# Patient Record
Sex: MALE | Race: ASIAN
[De-identification: names, ages, dates, MRNs, and addresses within clinical notes are randomized per-mention and may not be internally consistent; named-entity substitution may affect disease eponyms.]

---

## 2018-02-02 ENCOUNTER — HOSPITAL ENCOUNTER (EMERGENCY)
Dept: HOSPITAL 67 - ED | Age: 68
Discharge: HOME | End: 2018-02-02
Payer: COMMERCIAL

## 2018-02-02 VITALS — SYSTOLIC BLOOD PRESSURE: 144 MMHG | TEMPERATURE: 97.5 F | DIASTOLIC BLOOD PRESSURE: 74 MMHG

## 2018-02-02 VITALS — HEIGHT: 70 IN | WEIGHT: 128 LBS | BODY MASS INDEX: 18.32 KG/M2

## 2018-02-02 DIAGNOSIS — Z72.0: ICD-10-CM

## 2018-02-02 DIAGNOSIS — J43.2: Primary | ICD-10-CM

## 2018-02-02 DIAGNOSIS — F10.10: ICD-10-CM

## 2018-02-02 LAB
PLATELET # BLD: 287 K/UL (ref 142–355)
POTASSIUM SERPL-SCNC: 3.8 MMOL/L (ref 3.6–5.2)
SODIUM SERPL-SCNC: 138 MMOL/L (ref 136–145)

## 2018-02-02 PROCEDURE — 82550 ASSAY OF CK (CPK): CPT

## 2018-02-02 PROCEDURE — 84484 ASSAY OF TROPONIN QUANT: CPT

## 2018-02-02 PROCEDURE — 93005 ELECTROCARDIOGRAM TRACING: CPT

## 2018-02-02 PROCEDURE — 80053 COMPREHEN METABOLIC PANEL: CPT

## 2018-02-02 PROCEDURE — 85027 COMPLETE CBC AUTOMATED: CPT

## 2018-02-02 PROCEDURE — 99283 EMERGENCY DEPT VISIT LOW MDM: CPT

## 2019-09-17 ENCOUNTER — HOSPITAL ENCOUNTER (EMERGENCY)
Dept: HOSPITAL 67 - ED | Age: 69
Discharge: ANOTHER HOSPITAL | End: 2019-09-17
Payer: COMMERCIAL

## 2019-09-17 VITALS — DIASTOLIC BLOOD PRESSURE: 93 MMHG | SYSTOLIC BLOOD PRESSURE: 139 MMHG

## 2019-09-17 VITALS — WEIGHT: 100 LBS | BODY MASS INDEX: 14.32 KG/M2 | HEIGHT: 70 IN

## 2019-09-17 VITALS — TEMPERATURE: 97.2 F

## 2019-09-17 DIAGNOSIS — G46.0: ICD-10-CM

## 2019-09-17 DIAGNOSIS — F17.210: ICD-10-CM

## 2019-09-17 DIAGNOSIS — I63.411: Primary | ICD-10-CM

## 2019-09-17 DIAGNOSIS — G81.94: ICD-10-CM

## 2019-09-17 LAB
APTT BLD: 24.3 SECONDS (ref 24.5–33.6)
PLATELET # BLD: 242 K/UL (ref 142–355)
POTASSIUM SERPL-SCNC: 4.1 MMOL/L (ref 3.6–5.2)
SODIUM SERPL-SCNC: 144 MMOL/L (ref 136–145)

## 2019-09-17 PROCEDURE — 80307 DRUG TEST PRSMV CHEM ANLYZR: CPT

## 2019-09-17 PROCEDURE — 85730 THROMBOPLASTIN TIME PARTIAL: CPT

## 2019-09-17 PROCEDURE — 0T9B70Z DRAINAGE OF BLADDER WITH DRAINAGE DEVICE, VIA NATURAL OR ARTIFICIAL OPENING: ICD-10-PCS | Performed by: FAMILY MEDICINE

## 2019-09-17 PROCEDURE — 84484 ASSAY OF TROPONIN QUANT: CPT

## 2019-09-17 PROCEDURE — 99285 EMERGENCY DEPT VISIT HI MDM: CPT

## 2019-09-17 PROCEDURE — 96365 THER/PROPH/DIAG IV INF INIT: CPT

## 2019-09-17 PROCEDURE — 96375 TX/PRO/DX INJ NEW DRUG ADDON: CPT

## 2019-09-17 PROCEDURE — 96361 HYDRATE IV INFUSION ADD-ON: CPT

## 2019-09-17 PROCEDURE — 80320 DRUG SCREEN QUANTALCOHOLS: CPT

## 2019-09-17 PROCEDURE — 82550 ASSAY OF CK (CPK): CPT

## 2019-09-17 PROCEDURE — 85027 COMPLETE CBC AUTOMATED: CPT

## 2019-09-17 PROCEDURE — 85610 PROTHROMBIN TIME: CPT

## 2019-09-17 PROCEDURE — 80053 COMPREHEN METABOLIC PANEL: CPT

## 2019-09-17 PROCEDURE — 93005 ELECTROCARDIOGRAM TRACING: CPT

## 2019-09-17 PROCEDURE — 81000 URINALYSIS NONAUTO W/SCOPE: CPT

## 2019-09-17 PROCEDURE — 51702 INSERT TEMP BLADDER CATH: CPT

## 2019-11-21 ENCOUNTER — HOSPITAL ENCOUNTER (OUTPATIENT)
Dept: HOSPITAL 67 - RAD | Age: 69
Discharge: HOME | End: 2019-11-21
Attending: NURSE PRACTITIONER
Payer: COMMERCIAL

## 2019-11-21 DIAGNOSIS — M54.12: Primary | ICD-10-CM

## 2019-12-17 ENCOUNTER — HOSPITAL ENCOUNTER (OUTPATIENT)
Dept: HOSPITAL 67 - MRI | Age: 69
Discharge: HOME | End: 2019-12-17
Attending: NURSE PRACTITIONER
Payer: COMMERCIAL

## 2019-12-17 DIAGNOSIS — M47.812: Primary | ICD-10-CM

## 2020-03-26 ENCOUNTER — HOSPITAL ENCOUNTER (EMERGENCY)
Dept: HOSPITAL 67 - ED | Age: 70
Discharge: HOME | End: 2020-03-26
Payer: COMMERCIAL

## 2020-03-26 VITALS — HEIGHT: 70 IN | BODY MASS INDEX: 17.32 KG/M2 | WEIGHT: 121 LBS

## 2020-03-26 VITALS — SYSTOLIC BLOOD PRESSURE: 111 MMHG | DIASTOLIC BLOOD PRESSURE: 69 MMHG | TEMPERATURE: 98.1 F

## 2020-03-26 DIAGNOSIS — L02.416: Primary | ICD-10-CM

## 2020-03-26 PROCEDURE — 99282 EMERGENCY DEPT VISIT SF MDM: CPT

## 2021-07-01 ENCOUNTER — HOSPITAL ENCOUNTER (OUTPATIENT)
Dept: HOSPITAL 67 - RAD | Age: 71
Discharge: HOME | End: 2021-07-01
Attending: NURSE PRACTITIONER
Payer: COMMERCIAL

## 2021-07-01 DIAGNOSIS — J44.9: Primary | ICD-10-CM

## 2021-07-30 ENCOUNTER — HOSPITAL ENCOUNTER (OUTPATIENT)
Dept: HOSPITAL 67 - CT | Age: 71
Discharge: HOME | End: 2021-07-30
Attending: NURSE PRACTITIONER
Payer: COMMERCIAL

## 2021-07-30 DIAGNOSIS — F17.210: Primary | ICD-10-CM

## 2021-08-05 ENCOUNTER — HOSPITAL ENCOUNTER (OUTPATIENT)
Dept: HOSPITAL 67 - MED/SURG | Age: 71
Setting detail: OBSERVATION
LOS: 1 days | Discharge: HOME | End: 2021-08-06
Attending: FAMILY MEDICINE | Admitting: FAMILY MEDICINE
Payer: COMMERCIAL

## 2021-08-05 VITALS — TEMPERATURE: 97.5 F | DIASTOLIC BLOOD PRESSURE: 81 MMHG | SYSTOLIC BLOOD PRESSURE: 126 MMHG

## 2021-08-05 VITALS — SYSTOLIC BLOOD PRESSURE: 88 MMHG | TEMPERATURE: 98.3 F | DIASTOLIC BLOOD PRESSURE: 54 MMHG

## 2021-08-05 VITALS
BODY MASS INDEX: 15.4 KG/M2 | WEIGHT: 107.56 LBS | HEIGHT: 70 IN | HEIGHT: 70 IN | BODY MASS INDEX: 15.4 KG/M2 | WEIGHT: 107.56 LBS

## 2021-08-05 DIAGNOSIS — F19.20: ICD-10-CM

## 2021-08-05 DIAGNOSIS — R91.8: ICD-10-CM

## 2021-08-05 DIAGNOSIS — R62.7: Primary | ICD-10-CM

## 2021-08-05 DIAGNOSIS — E86.0: ICD-10-CM

## 2021-08-05 LAB
PLATELET # BLD: 236 K/UL (ref 142–355)
POTASSIUM SERPL-SCNC: 4 MMOL/L (ref 3.6–5.2)
SODIUM SERPL-SCNC: 141 MMOL/L (ref 136–145)

## 2021-08-05 PROCEDURE — 81000 URINALYSIS NONAUTO W/SCOPE: CPT

## 2021-08-05 PROCEDURE — 82550 ASSAY OF CK (CPK): CPT

## 2021-08-05 PROCEDURE — U0003 INFECTIOUS AGENT DETECTION BY NUCLEIC ACID (DNA OR RNA); SEVERE ACUTE RESPIRATORY SYNDROME CORONAVIRUS 2 (SARS-COV-2) (CORONAVIRUS DISEASE [COVID-19]), AMPLIFIED PROBE TECHNIQUE, MAKING USE OF HIGH THROUGHPUT TECHNOLOGIES AS DESCRIBED BY CMS-2020-01-R: HCPCS

## 2021-08-05 PROCEDURE — 96360 HYDRATION IV INFUSION INIT: CPT

## 2021-08-05 PROCEDURE — 94640 AIRWAY INHALATION TREATMENT: CPT

## 2021-08-05 PROCEDURE — 94664 DEMO&/EVAL PT USE INHALER: CPT

## 2021-08-05 PROCEDURE — 94760 N-INVAS EAR/PLS OXIMETRY 1: CPT

## 2021-08-05 PROCEDURE — 93005 ELECTROCARDIOGRAM TRACING: CPT

## 2021-08-05 PROCEDURE — G0378 HOSPITAL OBSERVATION PER HR: HCPCS

## 2021-08-05 PROCEDURE — 80307 DRUG TEST PRSMV CHEM ANLYZR: CPT

## 2021-08-05 PROCEDURE — 80053 COMPREHEN METABOLIC PANEL: CPT

## 2021-08-05 PROCEDURE — G0379 DIRECT REFER HOSPITAL OBSERV: HCPCS

## 2021-08-05 PROCEDURE — 87635 SARS-COV-2 COVID-19 AMP PRB: CPT

## 2021-08-05 PROCEDURE — 84484 ASSAY OF TROPONIN QUANT: CPT

## 2021-08-05 PROCEDURE — 99220: CPT

## 2021-08-05 PROCEDURE — 87040 BLOOD CULTURE FOR BACTERIA: CPT

## 2021-08-05 PROCEDURE — 83880 ASSAY OF NATRIURETIC PEPTIDE: CPT

## 2021-08-05 PROCEDURE — 96361 HYDRATE IV INFUSION ADD-ON: CPT

## 2021-08-05 PROCEDURE — 85027 COMPLETE CBC AUTOMATED: CPT

## 2021-08-05 NOTE — NUR
PT AWAKE, ALERT,ORIENTED X4. PT DENIES PAIN AT THIS TIME. IV FLUID NS @80
ML/HR INFUSING WITHOUT DIFFICULTY. NO DISTRESS NOTED.

## 2021-08-06 VITALS — DIASTOLIC BLOOD PRESSURE: 51 MMHG | TEMPERATURE: 98.1 F | SYSTOLIC BLOOD PRESSURE: 84 MMHG

## 2021-08-06 VITALS — DIASTOLIC BLOOD PRESSURE: 45 MMHG | SYSTOLIC BLOOD PRESSURE: 78 MMHG | TEMPERATURE: 97.7 F

## 2021-08-06 VITALS — TEMPERATURE: 97.7 F | DIASTOLIC BLOOD PRESSURE: 56 MMHG | SYSTOLIC BLOOD PRESSURE: 91 MMHG

## 2021-08-06 VITALS — TEMPERATURE: 97.6 F | DIASTOLIC BLOOD PRESSURE: 57 MMHG | SYSTOLIC BLOOD PRESSURE: 97 MMHG

## 2021-08-06 NOTE — NUR
8/6/2021 0950 PT AWAKE ALERT AMBULATED TO RESTROOM WITHOUT DIFFICULTY NAD
NOTED.CALL LIGHT WITHIN REACH.CC

## 2021-08-06 NOTE — NUR
PT SLEEPING. IV FLUID INFUSING WITHOUT PROBLEM. PT RECEIVING RESPIRATORY
TREATMENTS. NO VOICED COMPLAINTS

## 2021-08-06 NOTE — NUR
8/6/21 1627 DISCHARGED TO HOME WITH BROTHER INSTRUCTIONS GIVEN AND SIGNED.SL
REMOVED TO RT FOREARM APPLIED 2X2 SECURED WITH TAPE.CC

## 2021-10-25 ENCOUNTER — HOSPITAL ENCOUNTER (OUTPATIENT)
Dept: HOSPITAL 67 - CT | Age: 71
Discharge: HOME | End: 2021-10-25
Attending: INTERNAL MEDICINE
Payer: COMMERCIAL

## 2021-10-25 DIAGNOSIS — R91.8: Primary | ICD-10-CM

## 2022-03-09 ENCOUNTER — HOSPITAL ENCOUNTER (OUTPATIENT)
Dept: HOSPITAL 67 - US | Age: 72
Discharge: HOME | End: 2022-03-09
Attending: NURSE PRACTITIONER
Payer: COMMERCIAL

## 2022-03-09 DIAGNOSIS — I73.9: Primary | ICD-10-CM

## 2022-03-16 ENCOUNTER — HOSPITAL ENCOUNTER (OUTPATIENT)
Dept: HOSPITAL 67 - RESP | Age: 72
Discharge: HOME | End: 2022-03-16
Attending: NURSE PRACTITIONER
Payer: COMMERCIAL

## 2022-03-16 DIAGNOSIS — I10: Primary | ICD-10-CM

## 2022-03-24 ENCOUNTER — HOSPITAL ENCOUNTER (OUTPATIENT)
Dept: HOSPITAL 67 - NM | Age: 72
Discharge: HOME | End: 2022-03-24
Attending: NURSE PRACTITIONER
Payer: COMMERCIAL

## 2022-03-24 VITALS — BODY MASS INDEX: 16.8 KG/M2 | HEIGHT: 71 IN | WEIGHT: 120 LBS

## 2022-03-24 DIAGNOSIS — Z79.899: ICD-10-CM

## 2022-03-24 DIAGNOSIS — I10: Primary | ICD-10-CM

## 2022-03-24 PROCEDURE — A9500 TC99M SESTAMIBI: HCPCS

## 2022-10-10 ENCOUNTER — HOSPITAL ENCOUNTER (OUTPATIENT)
Dept: HOSPITAL 67 - MED/SURG | Age: 72
Setting detail: OBSERVATION
LOS: 1 days | Discharge: HOME | End: 2022-10-11
Attending: FAMILY MEDICINE | Admitting: FAMILY MEDICINE
Payer: COMMERCIAL

## 2022-10-10 VITALS
BODY MASS INDEX: 15.91 KG/M2 | HEIGHT: 70 IN | WEIGHT: 111.13 LBS | WEIGHT: 111.13 LBS | BODY MASS INDEX: 15.91 KG/M2 | HEIGHT: 70 IN

## 2022-10-10 VITALS — DIASTOLIC BLOOD PRESSURE: 90 MMHG | TEMPERATURE: 97.6 F | SYSTOLIC BLOOD PRESSURE: 121 MMHG

## 2022-10-10 VITALS — TEMPERATURE: 98.4 F | SYSTOLIC BLOOD PRESSURE: 102 MMHG | DIASTOLIC BLOOD PRESSURE: 67 MMHG

## 2022-10-10 DIAGNOSIS — Z11.52: ICD-10-CM

## 2022-10-10 DIAGNOSIS — F19.10: ICD-10-CM

## 2022-10-10 DIAGNOSIS — I10: ICD-10-CM

## 2022-10-10 DIAGNOSIS — R07.89: Primary | ICD-10-CM

## 2022-10-10 DIAGNOSIS — I50.9: ICD-10-CM

## 2022-10-10 LAB
APTT BLD: 28 SECONDS (ref 24.5–33.6)
PLATELET # BLD: 204 K/UL (ref 142–355)
POTASSIUM SERPL-SCNC: 4.8 MMOL/L (ref 3.6–5.2)
SODIUM SERPL-SCNC: 139 MMOL/L (ref 136–145)

## 2022-10-10 PROCEDURE — 36415 COLL VENOUS BLD VENIPUNCTURE: CPT

## 2022-10-10 PROCEDURE — 84484 ASSAY OF TROPONIN QUANT: CPT

## 2022-10-10 PROCEDURE — 85027 COMPLETE CBC AUTOMATED: CPT

## 2022-10-10 PROCEDURE — 83735 ASSAY OF MAGNESIUM: CPT

## 2022-10-10 PROCEDURE — 87502 INFLUENZA DNA AMP PROBE: CPT

## 2022-10-10 PROCEDURE — 82550 ASSAY OF CK (CPK): CPT

## 2022-10-10 PROCEDURE — 80053 COMPREHEN METABOLIC PANEL: CPT

## 2022-10-10 PROCEDURE — G0379 DIRECT REFER HOSPITAL OBSERV: HCPCS

## 2022-10-10 PROCEDURE — 96372 THER/PROPH/DIAG INJ SC/IM: CPT

## 2022-10-10 PROCEDURE — G0378 HOSPITAL OBSERVATION PER HR: HCPCS

## 2022-10-10 PROCEDURE — 83880 ASSAY OF NATRIURETIC PEPTIDE: CPT

## 2022-10-10 PROCEDURE — 87635 SARS-COV-2 COVID-19 AMP PRB: CPT

## 2022-10-10 PROCEDURE — 85610 PROTHROMBIN TIME: CPT

## 2022-10-10 PROCEDURE — U0003 INFECTIOUS AGENT DETECTION BY NUCLEIC ACID (DNA OR RNA); SEVERE ACUTE RESPIRATORY SYNDROME CORONAVIRUS 2 (SARS-COV-2) (CORONAVIRUS DISEASE [COVID-19]), AMPLIFIED PROBE TECHNIQUE, MAKING USE OF HIGH THROUGHPUT TECHNOLOGIES AS DESCRIBED BY CMS-2020-01-R: HCPCS

## 2022-10-10 PROCEDURE — 84100 ASSAY OF PHOSPHORUS: CPT

## 2022-10-10 PROCEDURE — 93005 ELECTROCARDIOGRAM TRACING: CPT

## 2022-10-10 PROCEDURE — 96374 THER/PROPH/DIAG INJ IV PUSH: CPT

## 2022-10-10 PROCEDURE — 85730 THROMBOPLASTIN TIME PARTIAL: CPT

## 2022-10-10 PROCEDURE — 99220: CPT

## 2022-10-10 NOTE — NUR
PATIENT ADMITTED TO  SERVICES DIRECT ADMIT ORIENTED TO ROOM AND CALL
LIGHT SYSTEM.VISTIOR PRESENT IN ROOM.CC

## 2022-10-10 NOTE — NUR
CHARGE NURSE SPOKE WITH  AT 18:10 ABOUT PATIENT'S RECENT LABS. NEW
ORDERS RECEIVED WERE TO ORDER A 1X DOSE OF 20MG IVP LASIX FOR ELEVATED BNP OF
1080. MISTAKEN ENTRY FOR PO 20MG LASIX, D/C ORDER, AND CREATED A NEW ORDER.
CHARGE NURSE AWARE.

## 2022-10-10 NOTE — NUR
REC'D PT LYING IN BED WITH EYES CLOSED. PT'S VS WNL. DENIES CHEST PAIN OR SOB.
TELEMETRY SHOW NORMAL SINUS RYTHMN. NO S/SX OF DISTRESS NOTED. CALL LITH
WITHIN REACH.

## 2022-10-11 VITALS — DIASTOLIC BLOOD PRESSURE: 63 MMHG | SYSTOLIC BLOOD PRESSURE: 93 MMHG | TEMPERATURE: 97.9 F

## 2022-10-11 VITALS — DIASTOLIC BLOOD PRESSURE: 63 MMHG | SYSTOLIC BLOOD PRESSURE: 107 MMHG | TEMPERATURE: 97.6 F

## 2022-10-11 VITALS — DIASTOLIC BLOOD PRESSURE: 61 MMHG | SYSTOLIC BLOOD PRESSURE: 92 MMHG | TEMPERATURE: 98.3 F

## 2022-10-11 VITALS — SYSTOLIC BLOOD PRESSURE: 93 MMHG | DIASTOLIC BLOOD PRESSURE: 54 MMHG | TEMPERATURE: 98 F

## 2022-10-11 VITALS — DIASTOLIC BLOOD PRESSURE: 66 MMHG | SYSTOLIC BLOOD PRESSURE: 105 MMHG | TEMPERATURE: 98.3 F

## 2022-10-11 LAB
PLATELET # BLD: 212 K/UL (ref 142–355)
POTASSIUM SERPL-SCNC: 4 MMOL/L (ref 3.6–5.2)
SODIUM SERPL-SCNC: 138 MMOL/L (ref 136–145)

## 2022-10-11 NOTE — NUR
SALINE LOCK REMOVED APPLIED 2X2 SECURED WITH TAPE,ALSO TELE REMOVED CLEANED
AND RETURNED TO NURSING STATION. DAUGHTER KELLIE NOTIFIED OF PATEINT BEING
DISCHARGED HOME WILL FOLLOW UP WITH  ON OCT 18,2022 AT 11.30 AM IN
[place] OFFICE.ALSO LASIX
20MG PO CALLED IN TO [facility] IN [facility].PATIENT
ASSISTED TO GET DRESSED WAITNG ON DAUGHTER TO PICK HIM UP.CC

## 2022-10-11 NOTE — NUR
DAUGHTER CALLED TO NURSING STATION UPDATED ON PATIENT CARE,EXPLAINED TO HER WE
ARE WAITING ON SOME LABS THIS MORNING WILL CALL HER [facility]  COMES IN
WITH UPDATE.TOLD HER HER DAD IS DOING GOOD THIS MORNING NO C/O CHESTPAIN THIS
AM OR NO PAIN DURING THE NIGHT.CC

## 2022-10-11 NOTE — NUR
PATIENT LYING IN BED TALKING WITH DAUGHTER AT BEDSIDE NO C/O PAIN AT THIS
TIME.DISCUSSED PLAN OF CARE WITH PATIENT AND DAUGHTER EXPLAINED TO HER
 WILL POSSIBLY DISCHARGE HIM THIS AFTERNOON AND SEND HIM HOME ON
LASIX 20MG PO DAILY.ALSO FOLLOW UP WITH  ON OCT 18,1130 AM.TOLD HER I
WILL CALL HER WITH ANY FURTHER INFORMATION.CC

## 2022-10-11 NOTE — NUR
DISCHARGE INSTRUCTIONS GIVEN AND SIGNED ALONG WITH APPT CARD FOR  APPT
TIME.ALSO RX FOR LASIX 20MG PO CALLED INTO [facility] IN [place] TOTAL 30
TABLETS.CC

## 2022-10-11 NOTE — NUR
PT LYING IN BED EYES CLOSED. PT DENIES CHEST PAIN. TROPONIN LEVEL INCREASED
FRON 80.30 TO 87.O. TEXTED INFO TO DR. CARRILLO. CURRENT TELEMETRY SHOWS PAC'S
AND PVC'S. NO S/SX OF DISTRESS NTED PT. STATED HE FELT FINE.

## 2022-10-11 NOTE — NUR
AWAKE ALERT LYING IN BED NO C/O CHESTPAIN OR DISCOMFORT RESP EVEN
NONLABORED.CALL LIGHT WITHIN REACH.CC

## 2022-10-15 ENCOUNTER — HOSPITAL ENCOUNTER (EMERGENCY)
Dept: HOSPITAL 67 - ED | Age: 72
Discharge: HOME | End: 2022-10-15
Payer: COMMERCIAL

## 2022-10-15 VITALS — WEIGHT: 110 LBS | BODY MASS INDEX: 15.75 KG/M2 | HEIGHT: 70 IN

## 2022-10-15 VITALS — TEMPERATURE: 98.2 F | DIASTOLIC BLOOD PRESSURE: 74 MMHG | SYSTOLIC BLOOD PRESSURE: 105 MMHG

## 2022-10-15 DIAGNOSIS — I50.9: Primary | ICD-10-CM

## 2022-10-15 DIAGNOSIS — N18.9: ICD-10-CM

## 2022-10-15 DIAGNOSIS — F17.210: ICD-10-CM

## 2022-10-15 DIAGNOSIS — Z79.899: ICD-10-CM

## 2022-10-15 DIAGNOSIS — J44.9: ICD-10-CM

## 2022-10-15 LAB
APTT BLD: 27 SECONDS (ref 24.5–33.6)
PLATELET # BLD: 221 K/UL (ref 142–355)
POTASSIUM SERPL-SCNC: 4.1 MMOL/L (ref 3.6–5.2)
SODIUM SERPL-SCNC: 135 MMOL/L (ref 136–145)

## 2022-10-15 PROCEDURE — 80307 DRUG TEST PRSMV CHEM ANLYZR: CPT

## 2022-10-15 PROCEDURE — 36415 COLL VENOUS BLD VENIPUNCTURE: CPT

## 2022-10-15 PROCEDURE — 93005 ELECTROCARDIOGRAM TRACING: CPT

## 2022-10-15 PROCEDURE — 85610 PROTHROMBIN TIME: CPT

## 2022-10-15 PROCEDURE — 85730 THROMBOPLASTIN TIME PARTIAL: CPT

## 2022-10-15 PROCEDURE — 99284 EMERGENCY DEPT VISIT MOD MDM: CPT

## 2022-10-15 PROCEDURE — 84484 ASSAY OF TROPONIN QUANT: CPT

## 2022-10-15 PROCEDURE — 83880 ASSAY OF NATRIURETIC PEPTIDE: CPT

## 2022-10-15 PROCEDURE — 80053 COMPREHEN METABOLIC PANEL: CPT

## 2022-10-15 PROCEDURE — 82550 ASSAY OF CK (CPK): CPT

## 2022-10-15 PROCEDURE — 85027 COMPLETE CBC AUTOMATED: CPT

## 2022-10-17 ENCOUNTER — HOSPITAL ENCOUNTER (OUTPATIENT)
Dept: HOSPITAL 67 - LAB | Age: 72
Discharge: HOME | End: 2022-10-17
Attending: NURSE PRACTITIONER
Payer: COMMERCIAL

## 2022-10-17 DIAGNOSIS — I50.20: Primary | ICD-10-CM

## 2022-10-17 LAB
POTASSIUM SERPL-SCNC: 4.9 MMOL/L (ref 3.6–5.2)
SODIUM SERPL-SCNC: 136 MMOL/L (ref 136–145)

## 2022-10-17 PROCEDURE — 83880 ASSAY OF NATRIURETIC PEPTIDE: CPT

## 2022-10-17 PROCEDURE — 85027 COMPLETE CBC AUTOMATED: CPT

## 2022-10-17 PROCEDURE — 80053 COMPREHEN METABOLIC PANEL: CPT

## 2022-11-30 ENCOUNTER — HOSPITAL ENCOUNTER (EMERGENCY)
Dept: HOSPITAL 67 - ED | Age: 72
Discharge: ANOTHER HOSPITAL | End: 2022-11-30
Payer: COMMERCIAL

## 2022-11-30 VITALS — WEIGHT: 112 LBS | HEIGHT: 70 IN | BODY MASS INDEX: 16.03 KG/M2

## 2022-11-30 VITALS — DIASTOLIC BLOOD PRESSURE: 71 MMHG | SYSTOLIC BLOOD PRESSURE: 112 MMHG

## 2022-11-30 VITALS — TEMPERATURE: 98 F

## 2022-11-30 DIAGNOSIS — Z79.01: ICD-10-CM

## 2022-11-30 DIAGNOSIS — U07.1: ICD-10-CM

## 2022-11-30 DIAGNOSIS — I21.4: Primary | ICD-10-CM

## 2022-11-30 DIAGNOSIS — F17.210: ICD-10-CM

## 2022-11-30 DIAGNOSIS — I10: ICD-10-CM

## 2022-11-30 DIAGNOSIS — Z51.81: ICD-10-CM

## 2022-11-30 DIAGNOSIS — J44.1: ICD-10-CM

## 2022-11-30 LAB
PLATELET # BLD: 236 K/UL (ref 142–355)
POTASSIUM SERPL-SCNC: 3.9 MMOL/L (ref 3.6–5.2)
SODIUM SERPL-SCNC: 140 MMOL/L (ref 136–145)

## 2022-11-30 PROCEDURE — U0003 INFECTIOUS AGENT DETECTION BY NUCLEIC ACID (DNA OR RNA); SEVERE ACUTE RESPIRATORY SYNDROME CORONAVIRUS 2 (SARS-COV-2) (CORONAVIRUS DISEASE [COVID-19]), AMPLIFIED PROBE TECHNIQUE, MAKING USE OF HIGH THROUGHPUT TECHNOLOGIES AS DESCRIBED BY CMS-2020-01-R: HCPCS

## 2022-11-30 PROCEDURE — 83880 ASSAY OF NATRIURETIC PEPTIDE: CPT

## 2022-11-30 PROCEDURE — 36600 WITHDRAWAL OF ARTERIAL BLOOD: CPT

## 2022-11-30 PROCEDURE — 85027 COMPLETE CBC AUTOMATED: CPT

## 2022-11-30 PROCEDURE — 96372 THER/PROPH/DIAG INJ SC/IM: CPT

## 2022-11-30 PROCEDURE — 94664 DEMO&/EVAL PT USE INHALER: CPT

## 2022-11-30 PROCEDURE — 87040 BLOOD CULTURE FOR BACTERIA: CPT

## 2022-11-30 PROCEDURE — 93005 ELECTROCARDIOGRAM TRACING: CPT

## 2022-11-30 PROCEDURE — 84484 ASSAY OF TROPONIN QUANT: CPT

## 2022-11-30 PROCEDURE — 99284 EMERGENCY DEPT VISIT MOD MDM: CPT

## 2022-11-30 PROCEDURE — 81002 URINALYSIS NONAUTO W/O SCOPE: CPT

## 2022-11-30 PROCEDURE — 96360 HYDRATION IV INFUSION INIT: CPT

## 2022-11-30 PROCEDURE — 96365 THER/PROPH/DIAG IV INF INIT: CPT

## 2022-11-30 PROCEDURE — 96375 TX/PRO/DX INJ NEW DRUG ADDON: CPT

## 2022-11-30 PROCEDURE — 85379 FIBRIN DEGRADATION QUANT: CPT

## 2022-11-30 PROCEDURE — 87635 SARS-COV-2 COVID-19 AMP PRB: CPT

## 2022-11-30 PROCEDURE — 80053 COMPREHEN METABOLIC PANEL: CPT

## 2022-11-30 PROCEDURE — 82805 BLOOD GASES W/O2 SATURATION: CPT

## 2022-11-30 PROCEDURE — 87502 INFLUENZA DNA AMP PROBE: CPT

## 2022-11-30 PROCEDURE — 83735 ASSAY OF MAGNESIUM: CPT

## 2022-11-30 PROCEDURE — 80307 DRUG TEST PRSMV CHEM ANLYZR: CPT

## 2022-12-12 ENCOUNTER — HOSPITAL ENCOUNTER (OUTPATIENT)
Dept: HOSPITAL 67 - RAD | Age: 72
Discharge: HOME | End: 2022-12-12
Attending: NURSE PRACTITIONER
Payer: COMMERCIAL

## 2022-12-12 DIAGNOSIS — J12.89: Primary | ICD-10-CM

## 2022-12-12 LAB — PLATELET # BLD: 227 K/UL (ref 142–355)

## 2022-12-12 PROCEDURE — 82805 BLOOD GASES W/O2 SATURATION: CPT

## 2022-12-12 PROCEDURE — 36600 WITHDRAWAL OF ARTERIAL BLOOD: CPT

## 2022-12-12 PROCEDURE — 36415 COLL VENOUS BLD VENIPUNCTURE: CPT

## 2022-12-12 PROCEDURE — 85027 COMPLETE CBC AUTOMATED: CPT

## 2023-03-03 ENCOUNTER — HOSPITAL ENCOUNTER (OUTPATIENT)
Dept: HOSPITAL 67 - ED | Age: 73
Setting detail: OBSERVATION
LOS: 2 days | Discharge: HOME | End: 2023-03-05
Attending: INTERNAL MEDICINE | Admitting: INTERNAL MEDICINE
Payer: COMMERCIAL

## 2023-03-03 VITALS — DIASTOLIC BLOOD PRESSURE: 65 MMHG | SYSTOLIC BLOOD PRESSURE: 102 MMHG

## 2023-03-03 VITALS — DIASTOLIC BLOOD PRESSURE: 58 MMHG | TEMPERATURE: 98.2 F | SYSTOLIC BLOOD PRESSURE: 94 MMHG

## 2023-03-03 VITALS
HEIGHT: 70 IN | BODY MASS INDEX: 16.36 KG/M2 | BODY MASS INDEX: 16.36 KG/M2 | WEIGHT: 114.25 LBS | HEIGHT: 70 IN | WEIGHT: 114.25 LBS

## 2023-03-03 VITALS — SYSTOLIC BLOOD PRESSURE: 123 MMHG | DIASTOLIC BLOOD PRESSURE: 78 MMHG

## 2023-03-03 VITALS — TEMPERATURE: 97.3 F | SYSTOLIC BLOOD PRESSURE: 125 MMHG | DIASTOLIC BLOOD PRESSURE: 87 MMHG

## 2023-03-03 VITALS — SYSTOLIC BLOOD PRESSURE: 11 MMHG | TEMPERATURE: 98.2 F | DIASTOLIC BLOOD PRESSURE: 78 MMHG

## 2023-03-03 VITALS — SYSTOLIC BLOOD PRESSURE: 123 MMHG | DIASTOLIC BLOOD PRESSURE: 79 MMHG

## 2023-03-03 VITALS — SYSTOLIC BLOOD PRESSURE: 113 MMHG | DIASTOLIC BLOOD PRESSURE: 82 MMHG

## 2023-03-03 DIAGNOSIS — J44.9: ICD-10-CM

## 2023-03-03 DIAGNOSIS — F12.10: ICD-10-CM

## 2023-03-03 DIAGNOSIS — Z51.81: ICD-10-CM

## 2023-03-03 DIAGNOSIS — Z79.01: ICD-10-CM

## 2023-03-03 DIAGNOSIS — J81.1: ICD-10-CM

## 2023-03-03 DIAGNOSIS — F14.10: ICD-10-CM

## 2023-03-03 DIAGNOSIS — R07.89: ICD-10-CM

## 2023-03-03 DIAGNOSIS — I50.23: ICD-10-CM

## 2023-03-03 DIAGNOSIS — F10.10: ICD-10-CM

## 2023-03-03 DIAGNOSIS — I11.0: Primary | ICD-10-CM

## 2023-03-03 DIAGNOSIS — R06.02: ICD-10-CM

## 2023-03-03 DIAGNOSIS — R09.02: ICD-10-CM

## 2023-03-03 DIAGNOSIS — I95.89: ICD-10-CM

## 2023-03-03 LAB
PLATELET # BLD: 236 K/UL (ref 142–355)
POTASSIUM SERPL-SCNC: 4.1 MMOL/L (ref 3.6–5.2)
SODIUM SERPL-SCNC: 140 MMOL/L (ref 136–145)

## 2023-03-03 PROCEDURE — G0378 HOSPITAL OBSERVATION PER HR: HCPCS

## 2023-03-03 PROCEDURE — 80320 DRUG SCREEN QUANTALCOHOLS: CPT

## 2023-03-03 PROCEDURE — 87635 SARS-COV-2 COVID-19 AMP PRB: CPT

## 2023-03-03 PROCEDURE — 83735 ASSAY OF MAGNESIUM: CPT

## 2023-03-03 PROCEDURE — 81002 URINALYSIS NONAUTO W/O SCOPE: CPT

## 2023-03-03 PROCEDURE — 80307 DRUG TEST PRSMV CHEM ANLYZR: CPT

## 2023-03-03 PROCEDURE — 83880 ASSAY OF NATRIURETIC PEPTIDE: CPT

## 2023-03-03 PROCEDURE — 99220: CPT

## 2023-03-03 PROCEDURE — 84484 ASSAY OF TROPONIN QUANT: CPT

## 2023-03-03 PROCEDURE — 96374 THER/PROPH/DIAG INJ IV PUSH: CPT

## 2023-03-03 PROCEDURE — 82550 ASSAY OF CK (CPK): CPT

## 2023-03-03 PROCEDURE — 84100 ASSAY OF PHOSPHORUS: CPT

## 2023-03-03 PROCEDURE — U0003 INFECTIOUS AGENT DETECTION BY NUCLEIC ACID (DNA OR RNA); SEVERE ACUTE RESPIRATORY SYNDROME CORONAVIRUS 2 (SARS-COV-2) (CORONAVIRUS DISEASE [COVID-19]), AMPLIFIED PROBE TECHNIQUE, MAKING USE OF HIGH THROUGHPUT TECHNOLOGIES AS DESCRIBED BY CMS-2020-01-R: HCPCS

## 2023-03-03 PROCEDURE — 36415 COLL VENOUS BLD VENIPUNCTURE: CPT

## 2023-03-03 PROCEDURE — 93005 ELECTROCARDIOGRAM TRACING: CPT

## 2023-03-03 PROCEDURE — 99284 EMERGENCY DEPT VISIT MOD MDM: CPT

## 2023-03-03 PROCEDURE — 80053 COMPREHEN METABOLIC PANEL: CPT

## 2023-03-03 PROCEDURE — 85027 COMPLETE CBC AUTOMATED: CPT

## 2023-03-04 VITALS — SYSTOLIC BLOOD PRESSURE: 83 MMHG | TEMPERATURE: 97.6 F | DIASTOLIC BLOOD PRESSURE: 50 MMHG

## 2023-03-04 VITALS — TEMPERATURE: 98.4 F | SYSTOLIC BLOOD PRESSURE: 87 MMHG | DIASTOLIC BLOOD PRESSURE: 52 MMHG

## 2023-03-04 VITALS — DIASTOLIC BLOOD PRESSURE: 60 MMHG | TEMPERATURE: 98.3 F | SYSTOLIC BLOOD PRESSURE: 92 MMHG

## 2023-03-04 VITALS — TEMPERATURE: 97.6 F | DIASTOLIC BLOOD PRESSURE: 69 MMHG | SYSTOLIC BLOOD PRESSURE: 96 MMHG

## 2023-03-04 VITALS — DIASTOLIC BLOOD PRESSURE: 51 MMHG | SYSTOLIC BLOOD PRESSURE: 84 MMHG | TEMPERATURE: 97.4 F

## 2023-03-04 VITALS — SYSTOLIC BLOOD PRESSURE: 99 MMHG | DIASTOLIC BLOOD PRESSURE: 67 MMHG | TEMPERATURE: 98.7 F

## 2023-03-05 VITALS — SYSTOLIC BLOOD PRESSURE: 100 MMHG | DIASTOLIC BLOOD PRESSURE: 64 MMHG | TEMPERATURE: 98 F

## 2023-03-05 VITALS — DIASTOLIC BLOOD PRESSURE: 100 MMHG | TEMPERATURE: 98.1 F | SYSTOLIC BLOOD PRESSURE: 160 MMHG

## 2023-03-05 LAB
PLATELET # BLD: 207 K/UL (ref 142–355)
POTASSIUM SERPL-SCNC: 3.6 MMOL/L (ref 3.6–5.2)
SODIUM SERPL-SCNC: 141 MMOL/L (ref 136–145)

## 2023-09-14 ENCOUNTER — HOSPITAL ENCOUNTER (OUTPATIENT)
Dept: HOSPITAL 67 - ED | Age: 73
Setting detail: OBSERVATION
LOS: 2 days | Discharge: HOME | End: 2023-09-16
Attending: FAMILY MEDICINE | Admitting: NURSE PRACTITIONER
Payer: COMMERCIAL

## 2023-09-14 VITALS — SYSTOLIC BLOOD PRESSURE: 101 MMHG | DIASTOLIC BLOOD PRESSURE: 69 MMHG

## 2023-09-14 VITALS — DIASTOLIC BLOOD PRESSURE: 65 MMHG | SYSTOLIC BLOOD PRESSURE: 100 MMHG

## 2023-09-14 VITALS — DIASTOLIC BLOOD PRESSURE: 64 MMHG | SYSTOLIC BLOOD PRESSURE: 95 MMHG

## 2023-09-14 VITALS — SYSTOLIC BLOOD PRESSURE: 98 MMHG | DIASTOLIC BLOOD PRESSURE: 65 MMHG

## 2023-09-14 VITALS — TEMPERATURE: 98.1 F | SYSTOLIC BLOOD PRESSURE: 101 MMHG | DIASTOLIC BLOOD PRESSURE: 69 MMHG

## 2023-09-14 VITALS — BODY MASS INDEX: 16.83 KG/M2 | HEIGHT: 70 IN | BODY MASS INDEX: 16.83 KG/M2 | WEIGHT: 117.56 LBS

## 2023-09-14 VITALS — TEMPERATURE: 97.3 F | SYSTOLIC BLOOD PRESSURE: 95 MMHG | DIASTOLIC BLOOD PRESSURE: 66 MMHG

## 2023-09-14 VITALS — TEMPERATURE: 97.3 F | DIASTOLIC BLOOD PRESSURE: 55 MMHG | SYSTOLIC BLOOD PRESSURE: 104 MMHG

## 2023-09-14 DIAGNOSIS — I47.29: ICD-10-CM

## 2023-09-14 DIAGNOSIS — F14.10: ICD-10-CM

## 2023-09-14 DIAGNOSIS — I11.0: Primary | ICD-10-CM

## 2023-09-14 DIAGNOSIS — Z72.0: ICD-10-CM

## 2023-09-14 DIAGNOSIS — F12.10: ICD-10-CM

## 2023-09-14 DIAGNOSIS — D64.89: ICD-10-CM

## 2023-09-14 DIAGNOSIS — I95.89: ICD-10-CM

## 2023-09-14 DIAGNOSIS — F10.10: ICD-10-CM

## 2023-09-14 DIAGNOSIS — I50.20: ICD-10-CM

## 2023-09-14 DIAGNOSIS — F15.10: ICD-10-CM

## 2023-09-14 DIAGNOSIS — R06.02: ICD-10-CM

## 2023-09-14 LAB
PLATELET # BLD: 216 K/UL (ref 142–355)
POTASSIUM SERPL-SCNC: 4.6 MMOL/L (ref 3.6–5.2)
SODIUM SERPL-SCNC: 139 MMOL/L (ref 136–145)

## 2023-09-14 PROCEDURE — 83735 ASSAY OF MAGNESIUM: CPT

## 2023-09-14 PROCEDURE — 99284 EMERGENCY DEPT VISIT MOD MDM: CPT

## 2023-09-14 PROCEDURE — 85027 COMPLETE CBC AUTOMATED: CPT

## 2023-09-14 PROCEDURE — 83880 ASSAY OF NATRIURETIC PEPTIDE: CPT

## 2023-09-14 PROCEDURE — 84484 ASSAY OF TROPONIN QUANT: CPT

## 2023-09-14 PROCEDURE — G0378 HOSPITAL OBSERVATION PER HR: HCPCS

## 2023-09-14 PROCEDURE — 99221 1ST HOSP IP/OBS SF/LOW 40: CPT

## 2023-09-14 PROCEDURE — 36415 COLL VENOUS BLD VENIPUNCTURE: CPT

## 2023-09-14 PROCEDURE — 96374 THER/PROPH/DIAG INJ IV PUSH: CPT

## 2023-09-14 PROCEDURE — 93005 ELECTROCARDIOGRAM TRACING: CPT

## 2023-09-14 PROCEDURE — 80048 BASIC METABOLIC PNL TOTAL CA: CPT

## 2023-09-14 PROCEDURE — 80053 COMPREHEN METABOLIC PANEL: CPT

## 2023-09-15 VITALS — SYSTOLIC BLOOD PRESSURE: 84 MMHG | DIASTOLIC BLOOD PRESSURE: 53 MMHG | TEMPERATURE: 98 F

## 2023-09-15 VITALS — SYSTOLIC BLOOD PRESSURE: 97 MMHG | TEMPERATURE: 97.8 F | DIASTOLIC BLOOD PRESSURE: 70 MMHG

## 2023-09-15 VITALS — SYSTOLIC BLOOD PRESSURE: 94 MMHG | TEMPERATURE: 97.5 F | DIASTOLIC BLOOD PRESSURE: 66 MMHG

## 2023-09-15 VITALS — SYSTOLIC BLOOD PRESSURE: 95 MMHG | TEMPERATURE: 98 F | DIASTOLIC BLOOD PRESSURE: 63 MMHG

## 2023-09-15 VITALS — TEMPERATURE: 98.1 F | DIASTOLIC BLOOD PRESSURE: 61 MMHG | SYSTOLIC BLOOD PRESSURE: 98 MMHG

## 2023-09-15 VITALS — DIASTOLIC BLOOD PRESSURE: 61 MMHG | TEMPERATURE: 97.8 F | SYSTOLIC BLOOD PRESSURE: 96 MMHG

## 2023-09-15 LAB
PLATELET # BLD: 181 K/UL (ref 142–355)
POTASSIUM SERPL-SCNC: 4.1 MMOL/L (ref 3.6–5.2)
SODIUM SERPL-SCNC: 139 MMOL/L (ref 136–145)

## 2023-09-16 VITALS — TEMPERATURE: 97.9 F | SYSTOLIC BLOOD PRESSURE: 112 MMHG | DIASTOLIC BLOOD PRESSURE: 79 MMHG

## 2023-09-16 VITALS — DIASTOLIC BLOOD PRESSURE: 69 MMHG | TEMPERATURE: 98.2 F | SYSTOLIC BLOOD PRESSURE: 97 MMHG

## 2023-09-16 VITALS — SYSTOLIC BLOOD PRESSURE: 100 MMHG | DIASTOLIC BLOOD PRESSURE: 72 MMHG | TEMPERATURE: 98.2 F

## 2023-09-16 VITALS — SYSTOLIC BLOOD PRESSURE: 98 MMHG | DIASTOLIC BLOOD PRESSURE: 66 MMHG | TEMPERATURE: 97.8 F

## 2023-09-16 VITALS — DIASTOLIC BLOOD PRESSURE: 65 MMHG | SYSTOLIC BLOOD PRESSURE: 94 MMHG | TEMPERATURE: 98.3 F

## 2023-09-16 LAB
PLATELET # BLD: 175 K/UL (ref 142–355)
POTASSIUM SERPL-SCNC: 3.8 MMOL/L (ref 3.6–5.2)
SODIUM SERPL-SCNC: 141 MMOL/L (ref 136–145)

## 2023-10-11 ENCOUNTER — HOSPITAL ENCOUNTER (OUTPATIENT)
Dept: HOSPITAL 67 - RESP | Age: 73
Discharge: HOME | End: 2023-10-11
Attending: INTERNAL MEDICINE
Payer: COMMERCIAL

## 2023-10-11 DIAGNOSIS — I42.8: Primary | ICD-10-CM
